# Patient Record
(demographics unavailable — no encounter records)

---

## 2024-10-25 NOTE — DATA REVIEWED
[FreeTextEntry1] : MRI of the cervical spine dated 1/19/24 suggests,  Multilevel degenerative changes with spondylolisthesis in the cervical spine.  Up to moderate spinal canal narrowing at C4-5, C5-6, C6-7. Moderate to severe BL foraminal narrowing at C3-4, C4-5, C5-6, C6-7.

## 2024-10-25 NOTE — DISCUSSION/SUMMARY
[de-identified] : A discussion regarding available pain management treatment options occurred with the patient. These included interventional, rehabilitative, pharmacological, and alternative modalities. We will proceed with the following:   Interventional treatment options: 1. At this time, she will proceed with a MERVIN C7-T1 MAC. Treatment options were discussed with the patient. The patient has been having persistent neck pain with radiculopathy with minimal improvement with conservative therapies. The patient was given the option to proceed with a cervical epidural steroid injection to try to get some pain relief.  If we are unable to get pain relief with this procedure, we will reassess our options before proceeding.   The risks and benefits were discussed which included bleeding, infection, nerve injury, no pain relief or worse, increased pain. All questions were answered, and the patient will schedule for the injection on the next available date.  The patient has severe anxiety of procedures that necessitates monitored anesthesia care (MAC). The procedure performed will be close to major nerves, arteries, and spinal cord and/or joint structures. Due to the proximity of these structures, we need the patient to be still during the procedure.  With the help of MAC, this will be safely achieved and decrease the risk of any complications.   Rehabilitative options: - c/w participation in active HEP as discussed and printed.   Medication based treatment options: -Continue with Celebrex 200mg BID for 4 weeks. Discussed risks and benefits. Avoid taking for any side effects. patient is aware to take for 2 weeks straight than take 1 week off before repeating. -Continue with Lyrica 50mg BID for a duration of 4 weeks.   Follow up 1-2 weeks post injection for assessment of efficacy and further recommendations.  I, Alexia Gambino, attest that this documentation has been prepared under the direction and in the presence of Provider Krish Wallace, DO The documentation recorded by the scribe, in my presence, accurately reflects the service I personally performed, and the decisions made by me with my edits as appropriate.   Best Regards, Krish Wallace D.O.

## 2024-10-25 NOTE — HISTORY OF PRESENT ILLNESS
[FreeTextEntry1] : HISTORY OF PRESENT ILLNESS: Ms. Nathan is a 58-year-old female presenting to the office today to establish care for her neck pain. She saw Dr. Polanco on 10/30/23 in regard to this same pain.   The pain started after no specific injury or event.  The patient has had this pain for months.  Patient describes the pain as moderate to severe.  During the last month the pain has been constant with symptoms worsening in the morning, evening, and night. Pain described as dull aching, throbbing and shooting. The pain radiates into her bilateral arms as well as her shoulder blades worse on the left side. Pain is increased with lying down. She notes she has trouble falling asleep at night secondary to the pian. She also states that she has trialed sessions of physical therapy which provide her with minimal relief. Pain is sharp in nature and has tried conservative tx by Dr. Polanco without relief.   Patient denies any bowel or bladder dysfunction, incontinence, or saddle anesthesia.  PRESENTING TODAY 10-: Last seen on 04/12/2024 and since then there has been no new complaints or acute changes. Patient presents to the office today for a follow up visit with continued neck pain. She previously underwent cervical paraspinal trapezius trigger point injections which has afforded her great sustained relief for 1 week.   She continues with pain described as burning, sharp and tingling. The pain radiates into her bilateral arms as well as her shoulder blades worse on the left side which refers into her arm associated with weakness. She notes the Meloxicam has been providing her with some sustained relief. Patient denies any bowel or bladder dysfunction, incontinence, or saddle anesthesia. Pain is 8/10 pain intensity.

## 2024-11-07 NOTE — PROCEDURE
[FreeTextEntry3] : Date of Service: 11/07/2024   Account: [id number]  Patient: ENZO CAMPOS   YOB: 1965  Age: 59 year  Surgeon:      Krish Wallace DO  Assistant:    None  Pre-Operative Diagnosis:         Cervical Radiculopathy (M54.12)  Post Operative Diagnosis:       Cervical Radiculopathy (M54.12)  Procedure:             Cervical (C7-T1) interlaminar epidural steroid injection under fluoroscopic guidance  Anesthesia:  MAC  This procedure was carried out using fluoroscopic guidance.  The risks and benefits of the procedure were discussed extensively with the patient.  The consent of the patient was obtained and the following procedure was performed. The patient was placed in the prone position on the fluoroscopy table and the area was prepped and draped in a sterile fashion.  A timeout was performed with all essential staff present and the site and side were verified.  The patient was placed in the prone position and optimized to patient comfort.  The cervical area was prepped and draped in a sterile fashion.  The fluoroscope visualized the C7-T1 interspace using slight cephalad-caudad angulation and this area was marked.  Using sterile technique the superficial skin was anesthetized with 1% Lidocaine.  A 20 gauge 3.5 inch Tuohy needle was advanced under fluoroscopy until ligament was engaged.  Using a contralateral oblique view, a "loss of resistance" to air technique was utilized in order to gain access to the epidural space.  After negative aspiration for heme and CSF, 1 cc of Omnipaque contrast was administered and the appropriate cervical epidurogram was obtained in the CARLITOS and A/P view..  A total injectate of 3 cc of preservative free normal saline and 10mg of Dexamethasone was then injected into the epidural space while maintaining meaningful verbal contact with the patient.    The needle was subsequently removed.  Vital signs remained normal.  Pulse oximeter was used throughout the procedure and the patient's pulse and oxygen saturation remained within normal limits.  The patient tolerated the procedure well.  There were no complications.  The patient was instructed to apply ice over the injection sites for twenty minutes every two hours for the next 24 to 48 hours.  Disposition:       1. The patient was advised to F/U in 1-2 weeks to assess the response to the injection.      2. The patient was also instructed to contact me immediately if there were any concerns related to the procedure performed.    Krish Wallace DO

## 2024-11-22 NOTE — DISCUSSION/SUMMARY
[de-identified] : A discussion regarding available pain management treatment options occurred with the patient. These included interventional, rehabilitative, pharmacological, and alternative modalities. We will proceed with the following:   Interventional treatment options: 1. At this time, she will proceed with a repeat MERVIN C7-T1 MAC. Treatment options were discussed with the patient. The patient has been having persistent neck pain with radiculopathy with minimal improvement with conservative therapies. The patient was given the option to proceed with a cervical epidural steroid injection to try to get some pain relief.  If we are unable to get pain relief with this procedure, we will reassess our options before proceeding.   The risks and benefits were discussed which included bleeding, infection, nerve injury, no pain relief or worse, increased pain. All questions were answered, and the patient will schedule for the injection on the next available date.  The patient has severe anxiety of procedures that necessitates monitored anesthesia care (MAC). The procedure performed will be close to major nerves, arteries, and spinal cord and/or joint structures. Due to the proximity of these structures, we need the patient to be still during the procedure.  With the help of MAC, this will be safely achieved and decrease the risk of any complications.  Rehabilitative options: - c/w participation in active HEP as discussed and printed.   Medication based treatment options: -Continue with Celebrex 200mg BID for 4 weeks. Discussed risks and benefits. Avoid taking for any side effects. patient is aware to take for 2 weeks straight than take 1 week off before repeating. -Continue with Lyrica 50mg BID for a duration of 4 weeks.  - trial Medrol Dose Pack 4mg, use as directed for a duration of 6 days.  Follow up 1-2 weeks post injection for assessment of efficacy and further recommendations.  I, Alexia Gambino, attest that this documentation has been prepared under the direction and in the presence of Provider Krish Wallace, DO The documentation recorded by the scribe, in my presence, accurately reflects the service I personally performed, and the decisions made by me with my edits as appropriate.   Best Regards, Krish Wallace D.O.

## 2024-11-22 NOTE — HISTORY OF PRESENT ILLNESS
[FreeTextEntry1] : HISTORY OF PRESENT ILLNESS: Ms. Nathan is a 58-year-old female presenting to the office today to establish care for her neck pain. She saw Dr. Polanco on 10/30/23 in regard to this same pain.   The pain started after no specific injury or event.  The patient has had this pain for months.  Patient describes the pain as moderate to severe.  During the last month the pain has been constant with symptoms worsening in the morning, evening, and night. Pain described as dull aching, throbbing and shooting. The pain radiates into her bilateral arms as well as her shoulder blades worse on the left side. Pain is increased with lying down. She notes she has trouble falling asleep at night secondary to the pian. She also states that she has trialed sessions of physical therapy which provide her with minimal relief. Pain is sharp in nature and has tried conservative tx by Dr. Polanco without relief.   Patient denies any bowel or bladder dysfunction, incontinence, or saddle anesthesia.  PRESENTING TODAY 11-: Last seen on 10/25/2024 and since then there has been no new complaints or acute changes. Patient presents to the office today for a follow up visit with continued neck pain.  She is status post a cervical (C7-T1) interlaminar epidural steroid injection under fluoroscopic guidance on 11/7/2024 which provided him with 50% sustained relief of her neck pain to date.  She continues with tingling into her right arm. She does note she experienced itchiness and hives 5 days post injection and continues with to date.  She notes the Meloxicam has been providing her with some sustained relief. Patient denies any bowel or bladder dysfunction, incontinence, or saddle anesthesia. Pain is 8/10 pain intensity.

## 2025-01-06 NOTE — PHYSICAL EXAM
[de-identified] : Patient is tender to palpation A1 pulley left middle finger.  There is mild triggering present with passive range of motion.  There is no erythema ecchymoses or abrasions.  On the right side she really has a positive Tinel's but negative median nerve compression test good range of motion to the fingers no erythema ecchymoses or abrasions

## 2025-01-06 NOTE — ASSESSMENT
[FreeTextEntry1] : Patient is a left middle finger trigger digit.  She may have carpal tunnel syndrome on the right side.  The left side is treated with a cortisone injection today.  She tolerated it well.  Will see how the injection does.  She will make arrangements to see us back in a month.  The possibility of a second injection was discussed.  Potential for surgical intervention was discussed as well

## 2025-01-06 NOTE — HISTORY OF PRESENT ILLNESS
[de-identified] : 59-year-old female left-sided middle finger pain discomfort and locking.  Also has numbness and tingling on the right side.  The right side is being treated by pain management as well.  The left side has had no treatment.  And she comes in for the evaluation today.

## 2025-06-10 NOTE — HISTORY OF PRESENT ILLNESS
[FreeTextEntry1] : 6/9/25 Patient had a kayleigh c7-t1 11/7 which gave a few months of 60% relief and then the pain slowly returned. She has neck pain that radiates down her arms that can be 9/10 and requesting to repeat another c epidural. lyrica and nsaids do not seem to help the pain.

## 2025-06-10 NOTE — DISCUSSION/SUMMARY
[de-identified] : A discussion regarding available pain management treatment options occurred with the patient. These included interventional, rehabilitative, pharmacological, and alternative modalities. We will proceed with the following:   Interventional treatment options: 1. At this time, she will proceed with a repeat MERVIN C7-T1. Treatment options were discussed with the patient. The patient has been having persistent neck pain with radiculopathy with minimal improvement with conservative therapies. The patient was given the option to proceed with a cervical epidural steroid injection to try to get some pain relief.  If we are unable to get pain relief with this procedure, we will reassess our options before proceeding.   The risks and benefits were discussed which included bleeding, infection, nerve injury, no pain relief or worse, increased pain. All questions were answered, and the patient will schedule for the injection on the next available date.  The patient has severe anxiety of procedures that necessitates monitored anesthesia care (MAC). The procedure performed will be close to major nerves, arteries, and spinal cord and/or joint structures. Due to the proximity of these structures, we need the patient to be still during the procedure.  With the help of MAC, this will be safely achieved and decrease the risk of any complications.     f/u in 2 weeks

## 2025-06-19 NOTE — PROCEDURE
[FreeTextEntry3] : Date of Service: 06/19/2025   Account: [id number]  Patient: ENZO CAMPOS   YOB: 1965  Age: 60 year  Surgeon:      Krish Wallace DO  Assistant:    None  Pre-Operative Diagnosis:         Cervical Radiculopathy (M54.12)  Post Operative Diagnosis:       Cervical Radiculopathy (M54.12)  Procedure:             Cervical (C7-T1) interlaminar epidural steroid injection under fluoroscopic guidance  Anesthesia:  none  This procedure was carried out using fluoroscopic guidance.  The risks and benefits of the procedure were discussed extensively with the patient.  The consent of the patient was obtained and the following procedure was performed. The patient was placed in the prone position on the fluoroscopy table and the area was prepped and draped in a sterile fashion.  A timeout was performed with all essential staff present and the site and side were verified.  The patient was placed in the prone position and optimized to patient comfort.  The cervical area was prepped and draped in a sterile fashion.  The fluoroscope visualized the C7-T1 interspace using slight cephalad-caudad angulation and this area was marked.  Using sterile technique the superficial skin was anesthetized with 1% Lidocaine.  A 20 gauge 3.5 inch Tuohy needle was advanced under fluoroscopy until ligament was engaged.  Using a contralateral oblique view, a "loss of resistance" to air technique was utilized in order to gain access to the epidural space.  After negative aspiration for heme and CSF, 1 cc of Omnipaque contrast was administered and the appropriate cervical epidurogram was obtained in the CARLITOS and A/P view..  A total injectate of 3 cc of preservative free normal saline and 10mg of Dexamethasone was then injected into the epidural space while maintaining meaningful verbal contact with the patient.    The needle was subsequently removed.  Vital signs remained normal.  Pulse oximeter was used throughout the procedure and the patient's pulse and oxygen saturation remained within normal limits.  The patient tolerated the procedure well.  There were no complications.  The patient was instructed to apply ice over the injection sites for twenty minutes every two hours for the next 24 to 48 hours.  Disposition:       1. The patient was advised to F/U in 1-2 weeks to assess the response to the injection.      2. The patient was also instructed to contact me immediately if there were any concerns related to the procedure performed.    Krish Wallace DO

## 2025-06-19 NOTE — PROCEDURE
[FreeTextEntry3] : Date of Service: 06/19/2025   Account: [id number]  Patient: ENZO CAMPOS   YOB: 1965  Age: 60 year  Surgeon:      Krish Wallace DO  Assistant:    None  Pre-Operative Diagnosis:         Cervical Radiculopathy (M54.12)  Post Operative Diagnosis:       Cervical Radiculopathy (M54.12)  Procedure:             Cervical (C7-T1) interlaminar epidural steroid injection under fluoroscopic guidance  Anesthesia:  none  This procedure was carried out using fluoroscopic guidance.  The risks and benefits of the procedure were discussed extensively with the patient.  The consent of the patient was obtained and the following procedure was performed. The patient was placed in the prone position on the fluoroscopy table and the area was prepped and draped in a sterile fashion.  A timeout was performed with all essential staff present and the site and side were verified.  The patient was placed in the prone position and optimized to patient comfort.  The cervical area was prepped and draped in a sterile fashion.  The fluoroscope visualized the C7-T1 interspace using slight cephalad-caudad angulation and this area was marked.  Using sterile technique the superficial skin was anesthetized with 1% Lidocaine.  A 20 gauge 3.5 inch Tuohy needle was advanced under fluoroscopy until ligament was engaged.  Using a contralateral oblique view, a "loss of resistance" to air technique was utilized in order to gain access to the epidural space.  After negative aspiration for heme and CSF, 1 cc of Omnipaque contrast was administered and the appropriate cervical epidurogram was obtained in the CARLITOS and A/P view..  A total injectate of 3 cc of preservative free normal saline and 10mg of Dexamethasone was then injected into the epidural space while maintaining meaningful verbal contact with the patient.    The needle was subsequently removed.  Vital signs remained normal.  Pulse oximeter was used throughout the procedure and the patient's pulse and oxygen saturation remained within normal limits.  The patient tolerated the procedure well.  There were no complications.  The patient was instructed to apply ice over the injection sites for twenty minutes every two hours for the next 24 to 48 hours.  Disposition:       1. The patient was advised to F/U in 1-2 weeks to assess the response to the injection.      2. The patient was also instructed to contact me immediately if there were any concerns related to the procedure performed.    Krish Wallace DO